# Patient Record
Sex: FEMALE | Race: WHITE
[De-identification: names, ages, dates, MRNs, and addresses within clinical notes are randomized per-mention and may not be internally consistent; named-entity substitution may affect disease eponyms.]

---

## 2019-03-20 ENCOUNTER — HOSPITAL ENCOUNTER (EMERGENCY)
Dept: HOSPITAL 75 - ER FS | Age: 79
Discharge: ANOTHER HOSPITAL | End: 2019-03-20
Payer: MEDICARE

## 2019-03-20 VITALS — WEIGHT: 110 LBS | HEIGHT: 66 IN | BODY MASS INDEX: 17.68 KG/M2

## 2019-03-20 VITALS — SYSTOLIC BLOOD PRESSURE: 123 MMHG | DIASTOLIC BLOOD PRESSURE: 82 MMHG

## 2019-03-20 DIAGNOSIS — A41.9: ICD-10-CM

## 2019-03-20 DIAGNOSIS — R65.21: ICD-10-CM

## 2019-03-20 DIAGNOSIS — N39.0: Primary | ICD-10-CM

## 2019-03-20 LAB
ALBUMIN SERPL-MCNC: 3.4 GM/DL (ref 3.2–4.5)
ALP SERPL-CCNC: 68 U/L (ref 40–136)
ALT SERPL-CCNC: 14 U/L (ref 0–55)
APTT BLD: 30 SEC (ref 24–35)
APTT PPP: YELLOW S
BACTERIA #/AREA URNS HPF: (no result) /HPF
BASOPHILS # BLD AUTO: 0 10^3/UL (ref 0–0.1)
BASOPHILS NFR BLD AUTO: 0 % (ref 0–10)
BILIRUB SERPL-MCNC: 0.3 MG/DL (ref 0.1–1)
BILIRUB UR QL STRIP: NEGATIVE
BUN/CREAT SERPL: 14
CALCIUM SERPL-MCNC: 8.8 MG/DL (ref 8.5–10.1)
CHLORIDE SERPL-SCNC: 102 MMOL/L (ref 98–107)
CO2 SERPL-SCNC: 26 MMOL/L (ref 21–32)
CREAT SERPL-MCNC: 1.46 MG/DL (ref 0.6–1.3)
EOSINOPHIL # BLD AUTO: 0.1 10^3/UL (ref 0–0.3)
EOSINOPHIL NFR BLD AUTO: 1 % (ref 0–10)
ERYTHROCYTE [DISTWIDTH] IN BLOOD BY AUTOMATED COUNT: 14.1 % (ref 10–14.5)
FIBRINOGEN PPP-MCNC: (no result) MG/DL
GFR SERPLBLD BASED ON 1.73 SQ M-ARVRAT: 35 ML/MIN
GLUCOSE SERPL-MCNC: 110 MG/DL (ref 70–105)
GLUCOSE UR STRIP-MCNC: NEGATIVE MG/DL
HCT VFR BLD CALC: 34 % (ref 35–52)
HGB BLD-MCNC: 10.8 G/DL (ref 11.5–16)
INR PPP: 1 (ref 0.8–1.4)
KETONES UR QL STRIP: NEGATIVE
LEUKOCYTE ESTERASE UR QL STRIP: (no result)
LYMPHOCYTES # BLD AUTO: 1.6 X 10^3 (ref 1–4)
LYMPHOCYTES NFR BLD AUTO: 23 % (ref 12–44)
MANUAL DIFFERENTIAL PERFORMED BLD QL: NO
MCH RBC QN AUTO: 29 PG (ref 25–34)
MCHC RBC AUTO-ENTMCNC: 32 G/DL (ref 32–36)
MCV RBC AUTO: 91 FL (ref 80–99)
MONOCYTES # BLD AUTO: 0.3 X 10^3 (ref 0–1)
MONOCYTES NFR BLD AUTO: 5 % (ref 0–12)
NEUTROPHILS # BLD AUTO: 4.8 X 10^3 (ref 1.8–7.8)
NEUTROPHILS NFR BLD AUTO: 70 % (ref 42–75)
NITRITE UR QL STRIP: NEGATIVE
PH UR STRIP: 6.5 [PH] (ref 5–9)
PLATELET # BLD: 257 10^3/UL (ref 130–400)
PMV BLD AUTO: 10.3 FL (ref 7.4–10.4)
POTASSIUM SERPL-SCNC: 4.3 MMOL/L (ref 3.6–5)
PROT SERPL-MCNC: 6.9 GM/DL (ref 6.4–8.2)
PROT UR QL STRIP: (no result)
PROTHROMBIN TIME: 12.8 SEC (ref 12.2–14.7)
RBC #/AREA URNS HPF: (no result) /HPF
SODIUM SERPL-SCNC: 142 MMOL/L (ref 135–145)
SP GR UR STRIP: <=1.005 (ref 1.02–1.02)
SQUAMOUS #/AREA URNS HPF: (no result) /HPF
UROBILINOGEN UR-MCNC: 0.2 MG/DL
WBC # BLD AUTO: 6.8 10^3/UL (ref 4.3–11)
WBC #/AREA URNS HPF: >100 /HPF

## 2019-03-20 PROCEDURE — 36415 COLL VENOUS BLD VENIPUNCTURE: CPT

## 2019-03-20 PROCEDURE — 85025 COMPLETE CBC W/AUTO DIFF WBC: CPT

## 2019-03-20 PROCEDURE — 87040 BLOOD CULTURE FOR BACTERIA: CPT

## 2019-03-20 PROCEDURE — 87088 URINE BACTERIA CULTURE: CPT

## 2019-03-20 PROCEDURE — 81000 URINALYSIS NONAUTO W/SCOPE: CPT

## 2019-03-20 PROCEDURE — 96365 THER/PROPH/DIAG IV INF INIT: CPT

## 2019-03-20 PROCEDURE — 85730 THROMBOPLASTIN TIME PARTIAL: CPT

## 2019-03-20 PROCEDURE — 96361 HYDRATE IV INFUSION ADD-ON: CPT

## 2019-03-20 PROCEDURE — 51701 INSERT BLADDER CATHETER: CPT

## 2019-03-20 PROCEDURE — 85610 PROTHROMBIN TIME: CPT

## 2019-03-20 PROCEDURE — 71045 X-RAY EXAM CHEST 1 VIEW: CPT

## 2019-03-20 PROCEDURE — 83605 ASSAY OF LACTIC ACID: CPT

## 2019-03-20 PROCEDURE — 80053 COMPREHEN METABOLIC PANEL: CPT

## 2019-03-20 PROCEDURE — 87804 INFLUENZA ASSAY W/OPTIC: CPT

## 2019-03-20 NOTE — ED GENERAL
General


Stated Complaint:  NAUSEA; VOMITING


Source of Information:  Patient


Exam Limitations:  Other (nonverbal at baseline)





History of Present Illness


Date Seen by Provider:  Mar 20, 2019


Time Seen by Provider:  09:55


Initial Comments


Patient presents to ER by EMS from [facility] with chief complaint of cough 

nausea vomiting times one day. She's been on Levaquin for a couple days for a 

UTI outpatient. No known fevers or chills. She is nonverbal baseline. Takes her 

medications mostly for psych. EMS gave her Zofran on started an IV and started 

some fluids. Her blood pressure was soft in the 100 systolic range. Oxygen sats 

in the upper 90s on room air. No history of diabetes. The patient does answer, 

"No," when asked if her belly is tender on direct palpation. The recommendation 

accompanies her also indicates that she has been ordered and DuoNeb's twice a 

day and Robitussin for cough.





Allergies and Home Medications


Allergies


Coded Allergies:  


     No Known Drug Allergies (Unverified , 3/20/19)





Patient Home Medication List


Home Medication List Reviewed:  Yes





Review of Systems


Review of Systems


Constitutional:  see HPI (review of systems Limited to staff and EMS report as 

the patient does not answer many questions.); No chills, No fever


EENTM:  No nose congestion, No throat pain


Respiratory:  cough, phlegm, wheezing


Cardiovascular:  No edema, No Hx of Intervention


Gastrointestinal:  No constipation, No diarrhea; nausea, vomiting


Musculoskeletal:  No back pain, No joint pain


Skin:  No pruritus, No rash





Past Medical-Social-Family Hx


Patient Social History


Alcohol Use:  Denies Use


Recreational Drug Use:  No


Smoking Status:  Never a Smoker





Physical Exam-Suspected Sepsis


Physical Exam


Vital Signs





Vital Signs - First Documented








 3/20/19





 10:00


 


Temp 98.5


 


Pulse 92


 


Resp 14


 


B/P (MAP) 99/57 (71)


 


Pulse Ox 97


 


O2 Delivery Room Air





Capillary Refill :


Height, Weight, BMI


Height: '"


Weight: lbs. oz. kg;  BMI


Method:


General Appearance:  No Apparent Distress, Thin


Eyes:  Bilateral Eye Normal Inspection, Bilateral Eye PERRL, Bilateral Eye EOMI


HEENT:  PERRL/EOMI, TMs Normal, Normal ENT Inspection, Pharynx Normal; No Moist 

Mucous Membranes


Neck:  Full Range of Motion, Normal Inspection, Non Tender, Supple


Respiratory:  No Accessory Muscle Use, No Respiratory Distress, Rales (few 

bilateral bases), Wheezing (few expiratory)


Cardiovascular:  Regular Rate, Rhythm, No Edema, Normal Peripheral Pulses


Gastrointestinal:  Normal Bowel Sounds, No Organomegaly, Non Tender, Soft


Extremity:  Normal Capillary Refill, Normal Inspection, No Pedal Edema


Neurologic/Psychiatric:  Alert, Oriented x3


Skin:  normal color, warm/dry





Focused Exam


Sepsis Stage:  Septic Shock


Possible Source:  Genitouriary


Lactate Level


3/20/19 10:20: Lactic Acid Level 2.74*H


3/20/19 13:01: Lactic Acid Level 1.81





Time of Focused Exam:  13:44


Respiratory:  Chest Non Tender, Lungs Clear, Normal Breath Sounds, No Accessory 

Muscle Use, No Respiratory Distress


Cardiovascular:  Regular Rate, Rhythm, No Edema, Normal Peripheral Pulses


Capillary Refill:  Less Than 3 Seconds


Peripheral Pulses:  2+ Radial Pulses (R), 2+ Radial Pulses (L)


Skin:  normal color, warm/dry


Lactic Acid Level





Laboratory Tests








Test


 3/20/19


13:01


 


Lactic Acid Level


 1.81 MMOL/L


(0.50-2.00)








Within 3hrs of presentation:  Admin fluids, Admin ABX, Blood cultures prior to 

ABX's, Focus exam, Lactate level, Vasopressin therapy (order been started)





Procedures/Interventions


Lumen:  triple


Central Line Procedure:  betadine prep (chlorhexidine prep), sterile drapes 

applied, sterile dressing applied


Position:  internal jugular (R)


Anesthesia:  Lidocaine


Volume Anesthetic (ccs):  3


Complications:  none


Post Position:  sutured, good blood return, position confirmed w/ CXR


Risks, benefits and alternatives were explained to the nephew and the patient. 

We are given the go ahead so we looked at the neck found a good site on her 

right IJ. 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 

anatomy. We used usual sterile fashion and put the introducer needle into her 

right IJ on first attempt but were unable after a few attempts to pass the 

guidewire so we withdrew the needle reposition the patient and on second 

attempt were able to pass the guidewire easily. We did cause some ventricular 

ectopy so we backed guidewire off 3 cm and the ectopy went away. We made a 

small nick in the skin using 11 blade scalpel provided and then removed the 

needle. We then passed the dilator and removed it from the guidewire. We then 

placed the central lumen of the already flushed triple lumen catheter over and 

stitched down in place at about 12.5 cm. We had good blood return and flushed 

easily. We then placed a Biopatch and sterile dressing. Patient tolerated the 

procedure well.





Progress/Results/Core Measures


Suspected Sepsis


SIRS


Temperature: 


Pulse:  


Respiratory Rate: 


 


Laboratory Tests


3/20/19 10:20: White Blood Count 6.8


Blood Pressure  / 


Mean: 


 





3/20/19 10:20: Lactic Acid Level 2.74*H


3/20/19 13:01: Lactic Acid Level 1.81


Laboratory Tests


3/20/19 10:20: 


Creatinine 1.46H, INR Comment 1.0, Platelet Count 257, Total Bilirubin 0.3








Results/Orders


Lab Results





Laboratory Tests








Test


 3/20/19


10:20 3/20/19


11:50 3/20/19


13:01 Range/Units


 


 


White Blood Count


 6.8 


 


 


 4.3-11.0


10^3/uL


 


Red Blood Count


 3.69 L


 


 


 4.35-5.85


10^6/uL


 


Hemoglobin 10.8 L   11.5-16.0  G/DL


 


Hematocrit 34 L   35-52  %


 


Mean Corpuscular Volume 91    80-99  FL


 


Mean Corpuscular Hemoglobin 29    25-34  PG


 


Mean Corpuscular Hemoglobin


Concent 32 


 


 


 32-36  G/DL





 


Red Cell Distribution Width 14.1    10.0-14.5  %


 


Platelet Count


 257 


 


 


 130-400


10^3/uL


 


Mean Platelet Volume 10.3    7.4-10.4  FL


 


Neutrophils (%) (Auto) 70    42-75  %


 


Lymphocytes (%) (Auto) 23    12-44  %


 


Monocytes (%) (Auto) 5    0-12  %


 


Eosinophils (%) (Auto) 1    0-10  %


 


Basophils (%) (Auto) 0    0-10  %


 


Neutrophils # (Auto) 4.8    1.8-7.8  X 10^3


 


Lymphocytes # (Auto) 1.6    1.0-4.0  X 10^3


 


Monocytes # (Auto) 0.3    0.0-1.0  X 10^3


 


Eosinophils # (Auto)


 0.1 


 


 


 0.0-0.3


10^3/uL


 


Basophils # (Auto)


 0.0 


 


 


 0.0-0.1


10^3/uL


 


Prothrombin Time 12.8    12.2-14.7  SEC


 


INR Comment 1.0    0.8-1.4  


 


Activated Partial


Thromboplast Time 30 


 


 


 24-35  SEC





 


Sodium Level 142    135-145  MMOL/L


 


Potassium Level 4.3    3.6-5.0  MMOL/L


 


Chloride Level 102      MMOL/L


 


Carbon Dioxide Level 26    21-32  MMOL/L


 


Anion Gap 14    5-14  MMOL/L


 


Blood Urea Nitrogen 20 H   7-18  MG/DL


 


Creatinine


 1.46 H


 


 


 0.60-1.30


MG/DL


 


Estimat Glomerular Filtration


Rate 35 


 


 


  





 


BUN/Creatinine Ratio 14     


 


Glucose Level 110 H     MG/DL


 


Lactic Acid Level


 2.74 *H


 


 1.81 


 0.50-2.00


MMOL/L


 


Calcium Level 8.8    8.5-10.1  MG/DL


 


Corrected Calcium 9.3    8.5-10.1  MG/DL


 


Total Bilirubin 0.3    0.1-1.0  MG/DL


 


Aspartate Amino Transf


(AST/SGOT) 17 


 


 


 5-34  U/L





 


Alanine Aminotransferase


(ALT/SGPT) 14 


 


 


 0-55  U/L





 


Alkaline Phosphatase 68      U/L


 


Total Protein 6.9    6.4-8.2  GM/DL


 


Albumin 3.4    3.2-4.5  GM/DL


 


Urine Color  YELLOW    


 


Urine Clarity  SL CLOUDY    


 


Urine pH  6.5   5-9  


 


Urine Specific Gravity  <=1.005   1.016-1.022  


 


Urine Protein  TRACE   NEGATIVE  


 


Urine Glucose (UA)  NEGATIVE   NEGATIVE  


 


Urine Ketones  NEGATIVE   NEGATIVE  


 


Urine Nitrite  NEGATIVE   NEGATIVE  


 


Urine Bilirubin  NEGATIVE   NEGATIVE  


 


Urine Urobilinogen  0.2   NORMAL  MG/DL


 


Urine Leukocyte Esterase  3+ H  NEGATIVE  


 


Urine RBC (Auto)  TRACE H  NEGATIVE  


 


Urine RBC  0-2    /HPF


 


Urine WBC  >100 H   /HPF


 


Urine Squamous Epithelial


Cells 


 2-5 


 


  /HPF





 


Urine Crystals  NONE    /LPF


 


Urine Bacteria  TRACE    /HPF


 


Urine Casts  NONE    /LPF


 


Urine Mucus  NONE    /LPF


 


Urine Culture Indicated  NO    








Micro Results





Microbiology


3/20/19 Influenza Types A,B Antigen (TADEO) - Final, Complete


          





My Orders





Orders - MAKSIM CHUNG


Cbc With Automated Diff (3/20/19 10:03)


Comprehensive Metabolic Panel (3/20/19 10:03)


Blood Culture (3/20/19 10:03)


Sputum Culture (3/20/19 10:03)


Urinalysis (3/20/19 10:03)


Urine Culture (3/20/19 10:03)


Protime With Inr (3/20/19 10:03)


Partial Thromboplastin Time (3/20/19 10:03)


Chest 1 View Ap/Pa Only (3/20/19 10:03)


Saline Lock/Iv-Start (3/20/19 10:03)


Saline Lock/Iv-Start (3/20/19 10:03)


Vital Signs Adult Sepsis Patie Q15M (3/20/19 10:03)


O2 (3/20/19 10:03)


Remove Rings In Anticipation O (3/20/19 10:03)


Lactic Acid Analyzer (3/20/19 10:03)


Ns Iv 1000 Ml (Sodium Chloride 0.9%) (3/20/19 10:03)


Cefepime Injection (Maxipime Injection) (3/20/19 10:15)


Saline Lock/Iv-Start (3/20/19 10:03)


Influenza A And B Antigens (3/20/19 10:03)


Albuterol/Ipra Inhalation Soln (Duoneb I (3/20/19 10:15)


Svn Small Volume Nebulizer (3/20/19 10:10)


Straight Cath For Spec.-Adult (3/20/19 11:45)


Norepinephrine (Levophed) (3/20/19 12:15)


Chest 1 View Ap/Pa Only (3/20/19 12:01)


Lactated Ringers (Lr 1000 Ml Iv Solution (3/20/19 13:49)


C Difficile Ag + Toxin A/B. (3/20/19 14:27)


Isolation Central Supply Req (3/20/19 14:27)





Medications Given in ED





Current Medications








 Medications  Dose


 Ordered  Sig/Alissa


 Route  Start Time


 Stop Time Status Last Admin


Dose Admin


 


 Albuterol/


 Ipratropium  3 ml  ONCE  ONCE


 INH  3/20/19 10:15


 3/20/19 10:16 DC 3/20/19 10:42


3 ML


 


 Cefepime HCl 1000


 mg/Sterile Water  10 ml @ 


 200 mls/hr  ONCE  ONCE


 IV  3/20/19 10:15


 3/20/19 10:17 DC 3/20/19 10:42


200 MLS/HR








Vital Signs/I&O











 3/20/19





 10:00


 


Temp 98.5


 


Pulse 92


 


Resp 14


 


B/P (MAP) 99/57 (71)


 


Pulse Ox 97


 


O2 Delivery Room Air





Capillary Refill :


Progress Note #1:  


   Time:  10:09


Progress Note


Soft blood pressure with a map of 67, heart rate above 90 recently being 

treated for UTI. Plan to do a septic workup and give a dose of cefepime. We'll 

also give her a DuoNeb and reassess her lungs after a chest x-ray.


Progress Note #2:  


   Time:  12:00


Progress Note


The patient's blood pressure has become very soft with a map going down to 60 

so we put her in Trendelenburg and given her maximal 30 mL/kg of fluids will 

continue these fluids at 150 an hour and put a central line in to do pressors 

with. We discussed the case with a nephew who is the D POA and he would like us 

to do everything including central lines or intubation up to the point of DO 

NOT RESUSCITATE if she has a cardiac arrest. He would also like the patient 

sent to [place] because she has a sister who lives there would be able to help 

visit and take care of her afterwards.


Patient is had multiple alarms her V. tach at 100 bpm but they've all been 

reviewed and none of them actually demonstrated V. tach.


Progress Note #3:  


   Time:  14:37


Progress Note


Initially the patient had 2 loose stools and so our plan was to get a C. 

difficile tox on the next one since she had been on Levaquin outpatient for 

about 5-7 days. Her third stool had a large impacted hard bowel movement in the 

middle of it and staff at the facility her marked she's been constipated 

lately. She does have MiraLAX on her order sheet when necessary. Since the 

stool is formed and hard I do not suspect that this is C. difficile colitis. 

She has no elevated white count, abdominal tenderness to palpation or fever 

currently.





Diagnostic Imaging





   Diagonstic Imaging:  Xray


   Plain Films/CT/US/NM/MRI:  chest (1v)


Comments


 [facility]


 [place], [place]





NAME:   KASIA CHANEY


MED REC#:   [id number]


ACCOUNT#:   [id number]


PT STATUS:   REG ER


:   1940


PHYSICIAN:   MAKSIM CHUNG MD


ADMIT DATE:   19/ER FS


 ***Draft***


Date of Exam:19





CHEST 1 VIEW AP/PA ONLY








INDICATION: Nausea and vomiting.





Time of exam 9:16 AM





No prior studies are available for comparison.





The heart size is normal. The pulmonary vascularity is


unremarkable. The lungs are clear. No infiltrate, effusion or


pneumothorax is detected.





Impression: No acute cardiopulmonary process is detected.





  Dictated on workstation # [id number]








Dict:   19 1025


Trans:   19 1025


AQUILES [id number]





Interpreted by:     VERONICA STOREY MD


Electronically signed by:


   Reviewed:  Reviewed by Me








   Diagonstic Imaging:  Xray


   Plain Films/CT/US/NM/MRI:  chest (1v)


Comments


NAME:   KASIA CHANEY


MED REC#:   [id number]


ACCOUNT#:   [id number]


PT STATUS:   REG ER


:   1940


PHYSICIAN:   MAKSIM CHUNG MD


ADMIT DATE:   19/ER FS


 ***Signed***


Date of Exam:19





CHEST 1 VIEW AP/PA ONLY








Indication: Central line placement





Portable chest 11:42 AM





Right jugular central line tip projects over the right innominate


vein. Heart size and pulmonary vascular normal. Lungs are clear.


There are no effusions or pneumothoraces.





Impression: No acute abnormalities in the chest.





Dictated by: 





  Dictated on workstation # [id number]








Dict:   19 1308


Trans:   19 1309


TB [id number]





Interpreted by:     ABDULAZIZ DALY MD


Electronically signed by: ABDULAZIZ DALY MD [id number] 1309


   Reviewed:  Reviewed by Me





Critical Care Note


Critical Care


Start Time:  13:15


Stop Time:  13:45


Total Time (minutes)


30 mins


Progress


Reviewed her labs and repeat examination. Her breath sounds are better after a 

DuoNeb but her heart rate is still up around 100 and her blood pressure was 

dropping where her map was 58. We ordered Levophed to be started peripherally 

and start a central line after discussing with the nephew who is the D POA who 

lives in [place]. He does want us to go ahead and pursue everything and 

would like us to send her to [facility] were her family is. We get a 

chest x-ray after the central line but unfortunately the positioning was poor 

so we repeated it and then could see good placement of central line shadow over 

the superior vena cava. Radiologist agreed. Before Levophed could be started 

however the patient's blood pressure improved just by putting her in 

Trendelenburg so we'll keep some IV fluids going at 100 cc an hour.





Departure


Impression





 Primary Impression:  


 UTI (urinary tract infection)


 Qualified Codes:  N30.00 - Acute cystitis without hematuria


 Additional Impression:  


 Septic shock


Disposition:   XFER SHT-TRM HOSP


Condition:  Stable





Transfer


Time Spoke to Accepting Phy:  13:50


Transfer Progress Notes


1335: Called [facility] and discussed with 1- call and they will 

page Dr. Gray.


Transfer Facility:  


[place], [place]


Method of Transfer:  EMS





Copy


Copies To 1:   SELF,MAKSIM VENCES MD Mar 20, 2019 10:10

## 2019-03-20 NOTE — DIAGNOSTIC IMAGING REPORT
Indication: Central line placement



Portable chest 11:42 AM



Right jugular central line tip projects over the right innominate

vein. Heart size and pulmonary vascular normal. Lungs are clear.

There are no effusions or pneumothoraces.



Impression: No acute abnormalities in the chest.



Dictated by: 



  Dictated on workstation # [id number]

## 2019-03-20 NOTE — DIAGNOSTIC IMAGING REPORT
INDICATION: Nausea and vomiting.



Time of exam 9:16 AM



No prior studies are available for comparison.



The heart size is normal. The pulmonary vascularity is

unremarkable. The lungs are clear. No infiltrate, effusion or

pneumothorax is detected.



Impression: No acute cardiopulmonary process is detected.



Dictated by: 



  Dictated on workstation # [id number]

## 2020-01-02 ENCOUNTER — HOSPITAL ENCOUNTER (EMERGENCY)
Dept: HOSPITAL 75 - ER FS | Age: 80
Discharge: ANOTHER HOSPITAL | End: 2020-01-02
Payer: MEDICARE

## 2020-01-02 VITALS — HEIGHT: 61.81 IN | BODY MASS INDEX: 15.01 KG/M2 | WEIGHT: 81.57 LBS

## 2020-01-02 VITALS — DIASTOLIC BLOOD PRESSURE: 92 MMHG | SYSTOLIC BLOOD PRESSURE: 154 MMHG

## 2020-01-02 DIAGNOSIS — K56.609: ICD-10-CM

## 2020-01-02 DIAGNOSIS — N39.0: Primary | ICD-10-CM

## 2020-01-02 DIAGNOSIS — F03.90: ICD-10-CM

## 2020-01-02 LAB
ALBUMIN SERPL-MCNC: 4 GM/DL (ref 3.2–4.5)
ALP SERPL-CCNC: 78 U/L (ref 40–136)
ALT SERPL-CCNC: 100 U/L (ref 0–55)
APTT PPP: YELLOW S
BACTERIA #/AREA URNS HPF: (no result) /HPF
BASOPHILS # BLD AUTO: 0 10^3/UL (ref 0–0.1)
BASOPHILS NFR BLD AUTO: 0 % (ref 0–10)
BASOPHILS NFR BLD MANUAL: 2 %
BILIRUB SERPL-MCNC: 0.6 MG/DL (ref 0.1–1)
BILIRUB UR QL STRIP: (no result)
BUN/CREAT SERPL: 41
CALCIUM SERPL-MCNC: 9.5 MG/DL (ref 8.5–10.1)
CHLORIDE SERPL-SCNC: 97 MMOL/L (ref 98–107)
CO2 SERPL-SCNC: 28 MMOL/L (ref 21–32)
CREAT SERPL-MCNC: 1.79 MG/DL (ref 0.6–1.3)
EOSINOPHIL # BLD AUTO: 0 10^3/UL (ref 0–0.3)
EOSINOPHIL NFR BLD AUTO: 0 % (ref 0–10)
EOSINOPHIL NFR BLD MANUAL: 0 %
ERYTHROCYTE [DISTWIDTH] IN BLOOD BY AUTOMATED COUNT: 12 % (ref 10–14.5)
FIBRINOGEN PPP-MCNC: (no result) MG/DL
GFR SERPLBLD BASED ON 1.73 SQ M-ARVRAT: 27 ML/MIN
GLUCOSE SERPL-MCNC: 188 MG/DL (ref 70–105)
GLUCOSE UR STRIP-MCNC: NEGATIVE MG/DL
HCT VFR BLD CALC: 33 % (ref 35–52)
HGB BLD-MCNC: 11 G/DL (ref 11.5–16)
KETONES UR QL STRIP: NEGATIVE
LEUKOCYTE ESTERASE UR QL STRIP: (no result)
LYMPHOCYTES # BLD AUTO: 0.3 X 10^3 (ref 1–4)
LYMPHOCYTES NFR BLD AUTO: 5 % (ref 12–44)
MANUAL DIFFERENTIAL PERFORMED BLD QL: YES
MCH RBC QN AUTO: 31 PG (ref 25–34)
MCHC RBC AUTO-ENTMCNC: 33 G/DL (ref 32–36)
MCV RBC AUTO: 95 FL (ref 80–99)
MONOCYTES # BLD AUTO: 0.4 X 10^3 (ref 0–1)
MONOCYTES NFR BLD AUTO: 7 % (ref 0–12)
MONOCYTES NFR BLD: 6 %
NEUTROPHILS # BLD AUTO: 5.4 X 10^3 (ref 1.8–7.8)
NEUTROPHILS NFR BLD AUTO: 87 % (ref 42–75)
NEUTS BAND NFR BLD MANUAL: 40 %
NEUTS BAND NFR BLD: 40 %
NITRITE UR QL STRIP: NEGATIVE
PH UR STRIP: 5.5 [PH] (ref 5–9)
PLATELET # BLD: 299 10^3/UL (ref 130–400)
PMV BLD AUTO: 10.9 FL (ref 7.4–10.4)
POTASSIUM SERPL-SCNC: 3.6 MMOL/L (ref 3.6–5)
PROT SERPL-MCNC: 7.7 GM/DL (ref 6.4–8.2)
PROT UR QL STRIP: (no result)
RBC #/AREA URNS HPF: (no result) /HPF
SODIUM SERPL-SCNC: 146 MMOL/L (ref 135–145)
SP GR UR STRIP: >1.03 (ref 1.02–1.02)
TOXIC GRANULES BLD QL SMEAR: (no result)
VARIANT LYMPHS NFR BLD MANUAL: 12 %
WBC # BLD AUTO: 6.2 10^3/UL (ref 4.3–11)
WBC #/AREA URNS HPF: (no result) /HPF

## 2020-01-02 PROCEDURE — 71045 X-RAY EXAM CHEST 1 VIEW: CPT

## 2020-01-02 PROCEDURE — 36415 COLL VENOUS BLD VENIPUNCTURE: CPT

## 2020-01-02 PROCEDURE — 74019 RADEX ABDOMEN 2 VIEWS: CPT

## 2020-01-02 PROCEDURE — 87088 URINE BACTERIA CULTURE: CPT

## 2020-01-02 PROCEDURE — 96375 TX/PRO/DX INJ NEW DRUG ADDON: CPT

## 2020-01-02 PROCEDURE — 85007 BL SMEAR W/DIFF WBC COUNT: CPT

## 2020-01-02 PROCEDURE — 85027 COMPLETE CBC AUTOMATED: CPT

## 2020-01-02 PROCEDURE — 87077 CULTURE AEROBIC IDENTIFY: CPT

## 2020-01-02 PROCEDURE — 51702 INSERT TEMP BLADDER CATH: CPT

## 2020-01-02 PROCEDURE — 87040 BLOOD CULTURE FOR BACTERIA: CPT

## 2020-01-02 PROCEDURE — 96374 THER/PROPH/DIAG INJ IV PUSH: CPT

## 2020-01-02 PROCEDURE — 83605 ASSAY OF LACTIC ACID: CPT

## 2020-01-02 PROCEDURE — 81000 URINALYSIS NONAUTO W/SCOPE: CPT

## 2020-01-02 PROCEDURE — 84484 ASSAY OF TROPONIN QUANT: CPT

## 2020-01-02 PROCEDURE — 96361 HYDRATE IV INFUSION ADD-ON: CPT

## 2020-01-02 PROCEDURE — 80053 COMPREHEN METABOLIC PANEL: CPT

## 2020-01-02 RX ADMIN — SODIUM CHLORIDE SCH MLS/HR: 900 INJECTION, SOLUTION INTRAVENOUS at 10:13

## 2020-01-02 RX ADMIN — SODIUM CHLORIDE SCH MLS/HR: 900 INJECTION, SOLUTION INTRAVENOUS at 10:52

## 2020-01-02 NOTE — ED GENERAL
General


Chief Complaint:  Respiratory Problems


Stated Complaint:  VOMITING





History of Present Illness


Date Seen by Provider:  Jan 2, 2020


Time Seen by Provider:  09:07


Initial Comments


80 yo female brought from nursing facility


pt is severely debilitated   dementia  non-verbal  DNR


seen here last year with  had sepsis UTI


report today is several hours of vomiting and low O2 sats noted this AM 


hx very limited





Allergies and Home Medications


Allergies


Coded Allergies:  


     No Known Drug Allergies (Unverified , 3/20/19)





Patient Home Medication List


Home Medication List Reviewed:  Yes





Review of Systems


Review of Systems


Constitutional:  no symptoms reported


EENTM:  no symptoms reported


Respiratory:  short of breath (low sats at NH)


Cardiovascular:  no symptoms reported


Gastrointestinal:  abdominal pain, nausea, vomiting


Pregnant:  No





Past Medical-Social-Family Hx


Patient Social History


2nd Hand Smoke Exposure:  No


Recent Foreign Travel:  Yes


Recent Hopitalizations:  No





Seasonal Allergies


Seasonal Allergies:  No





Past Medical History


Surgeries:  No


Respiratory:  No


Cardiac:  No


Neurological:  Yes (non-verbal)


Genitourinary:  No


Gastrointestinal:  No


Musculoskeletal:  Yes (bed bound)


Endocrine:  No


HEENT:  No


Cancer:  No


Psychosocial:  No


Integumentary:  No


Blood Disorders:  No





Physical Exam


Vital Signs





Vital Signs - First Documented








 1/2/20 1/2/20





 08:49 08:50


 


Temp 37.2 


 


Pulse 116 


 


Resp 22 


 


B/P (MAP) 153/92 (112) 


 


Pulse Ox 91 


 


O2 Delivery Room Air 


 


O2 Flow Rate  4.00


 


FiO2  94





Capillary Refill :


Height, Weight, BMI


Height: 5'6.00"


Weight: 110lbs. oz. 49.236839tb;  BMI


Method:Estimated


General Appearance:  No Apparent Distress


Eyes:  Bilateral Eye PERRL, Bilateral Eye EOMI


HEENT:  Other (tongue dry)


Respiratory:  Lungs Clear


Cardiovascular:  Regular Rate, Rhythm


Gastrointestinal:  Tenderness, Other (abd bloated and tight)


Rectal:  Other (no fecal impaction)


Back:  Other (no open skin ulceration)


Extremity:  No Pedal Edema


Neurologic/Psychiatric:  Alert, No Motor/Sensory Deficits, CNs II-XII Norm as 

Tested





Focused Exam


Lactate Level


1/2/20 08:50: Lactic Acid Level 4.30*H





Lactic Acid Level





Laboratory Tests








Test


 1/2/20


08:50


 


Lactic Acid Level


 4.30 MMOL/L


(0.50-2.00)  *H











Progress/Results/Core Measures


Suspected Sepsis


SIRS


Temperature: 


Pulse:  


Respiratory Rate: 


 


Laboratory Tests


1/2/20 08:50: White Blood Count 6.2


Blood Pressure  / 


Mean: 


 





1/2/20 08:50: Lactic Acid Level 4.30*H








Laboratory Tests


1/2/20 08:50: 


Creatinine 1.79H, Platelet Count 299, Total Bilirubin 0.6





Results/Orders


Lab Results





Laboratory Tests








Test


 1/2/20


08:50 1/2/20


09:40 Range/Units


 


 


White Blood Count


 6.2 


 


 4.3-11.0


10^3/uL


 


Red Blood Count


 3.52 L


 


 4.35-5.85


10^6/uL


 


Hemoglobin 11.0 L  11.5-16.0  G/DL


 


Hematocrit 33 L  35-52  %


 


Mean Corpuscular Volume 95   80-99  FL


 


Mean Corpuscular Hemoglobin 31   25-34  PG


 


Mean Corpuscular Hemoglobin


Concent 33 


 


 32-36  G/DL





 


Red Cell Distribution Width 12.0   10.0-14.5  %


 


Platelet Count


 299 


 


 130-400


10^3/uL


 


Mean Platelet Volume 10.9 H  7.4-10.4  FL


 


Neutrophils (%) (Auto) 87 H  42-75  %


 


Lymphocytes (%) (Auto) 5 L  12-44  %


 


Monocytes (%) (Auto) 7   0-12  %


 


Eosinophils (%) (Auto) 0   0-10  %


 


Basophils (%) (Auto) 0   0-10  %


 


Neutrophils # (Auto) 5.4   1.8-7.8  X 10^3


 


Lymphocytes # (Auto) 0.3 L  1.0-4.0  X 10^3


 


Monocytes # (Auto) 0.4   0.0-1.0  X 10^3


 


Eosinophils # (Auto)


 0.0 


 


 0.0-0.3


10^3/uL


 


Basophils # (Auto)


 0.0 


 


 0.0-0.1


10^3/uL


 


Sodium Level 146 H  135-145  MMOL/L


 


Potassium Level 3.6   3.6-5.0  MMOL/L


 


Chloride Level 97 L    MMOL/L


 


Carbon Dioxide Level 28   21-32  MMOL/L


 


Anion Gap 21 H  5-14  MMOL/L


 


Blood Urea Nitrogen 73 H  7-18  MG/DL


 


Creatinine


 1.79 H


 


 0.60-1.30


MG/DL


 


Estimat Glomerular Filtration


Rate 27 


 


  





 


BUN/Creatinine Ratio 41    


 


Glucose Level 188 H    MG/DL


 


Lactic Acid Level


 4.30 *H


 


 0.50-2.00


MMOL/L


 


Calcium Level 9.5   8.5-10.1  MG/DL


 


Corrected Calcium 9.5   8.5-10.1  MG/DL


 


Total Bilirubin 0.6   0.1-1.0  MG/DL


 


Aspartate Amino Transf


(AST/SGOT) 70 H


 


 5-34  U/L





 


Alanine Aminotransferase


(ALT/SGPT) 100 H


 


 0-55  U/L





 


Alkaline Phosphatase 78     U/L


 


Troponin I < 0.30   <0.30  NG/ML


 


Total Protein 7.7   6.4-8.2  GM/DL


 


Albumin 4.0   3.2-4.5  GM/DL


 


Urine Color  YELLOW   


 


Urine Clarity  CLOUDY   


 


Urine pH  5.5  5-9  


 


Urine Specific Gravity  >1.030  1.016-1.022  


 


Urine Protein  2+ H NEGATIVE  


 


Urine Glucose (UA)  NEGATIVE  NEGATIVE  


 


Urine Ketones  NEGATIVE  NEGATIVE  


 


Urine Nitrite  NEGATIVE  NEGATIVE  


 


Urine Bilirubin  1+ H NEGATIVE  


 


Urine Urobilinogen  0.2  < = 1.0  MG/DL


 


Urine Leukocyte Esterase  2+ H NEGATIVE  


 


Urine RBC (Auto)  3+ H NEGATIVE  


 


Urine RBC  TNTC H  /HPF


 


Urine WBC  NONE   /HPF


 


Urine Crystals  NONE   /LPF


 


Urine Bacteria  LARGE H  /HPF


 


Urine Casts  NONE   /LPF


 


Urine Mucus  NEGATIVE   /LPF


 


Urine Culture Indicated


 


 CULTURE


PENDING  











Micro Results





Microbiology





My Orders





Orders - NILA WALL MD


Iv Maintain (Order) (1/2/20 09:18)


Gunderson Cath (1/2/20 09:18)


Ns Iv 1000 Ml (Sodium Chloride 0.9%) (1/2/20 09:30)


Cardiac Monitor (1/2/20 09:18)


Blood Culture (1/2/20 09:18)


Ekg Tracing (1/2/20 09:24)


Abdomen Flat & Upright/Decub (1/2/20 09:38)


Urine Culture (1/2/20 09:45)


Ciprofloxacin Iv 400mg/200ml (Cipro Iv S (1/2/20 09:45)


Vital Signs Adult Sepsis Patie Q15M (1/2/20 09:47)


Ondansetron Injection (Zofran Injectio (1/2/20 10:00)


O2 (1/2/20 09:47)


Ns Iv 1000 Ml (Sodium Chloride 0.9%) (1/2/20 09:47)


Cefepime Injection (Maxipime Injection) (1/2/20 10:00)


Ng Tube Insert & Assessment (1/2/20 10:11)


Ekg Tracing (1/2/20 10:40)


Comprehensive Metabolic Panel (1/2/20 08:50)


Troponin I Fs (1/2/20 08:50)


Blood Culture (1/2/20 08:50)


Urinalysis (1/2/20 09:40)


Cbc With Automated Diff (1/2/20 08:50)


Manual Differential (1/2/20 08:50)


Lactic Acid Analyzer (1/2/20 08:50)





Medications Given in ED





Current Medications








 Medications  Dose


 Ordered  Sig/Alissa


 Route  Start Time


 Stop Time Status Last Admin


Dose Admin


 


 Cefepime HCl 1000


 mg/Sterile Water  10 ml @ 


 200 mls/hr  ONCE  ONCE


 IV  1/2/20 10:00


 1/2/20 10:02 DC 1/2/20 10:13


200 MLS/HR


 


 Ciprofloxacin/


 Dextrose  200 ml @ 


 200 mls/hr  ONCE  ONCE


 IV  1/2/20 09:45


 1/2/20 10:44 DC 1/2/20 10:13


200 MLS/HR


 


 Ondansetron HCl  4 mg  PRN  PRN


 IV  1/2/20 10:00


 1/2/20 10:14 DC 1/2/20 10:13


4 MG








Vital Signs/I&O











 1/2/20 1/2/20





 08:49 08:50


 


Temp 37.2 


 


Pulse 116 


 


Resp 22 


 


B/P (MAP) 153/92 (112) 


 


Pulse Ox 91 94


 


O2 Delivery Room Air Nasal Cannula


 


O2 Flow Rate  4.00


 


FiO2  94





Capillary Refill :


Progress Note :  


Progress Note


pt stable VS's


abd distended and firm  films show marked small bowel distension  NG ordered


CXR possible left basilar infiltrate


urine is brown opaque  UA ++for infection  


cultures of blood and urine ordered  Cefipime and Cipro ordered


Hb 11  WBC 6,200


lactate 4.3  giving IV fluids aggressively


BUN/creat  73/1.8  AG 21


EKG sinus tach @ 106  no acute ST changes





assess - UTI


severe ileus  vs bowel obst     possible LLL infiltrate


family requests pt transfer to [place]


call placed to transfer line [place]


discussed with hospitalist Dr. Venkata Leach, agrees to accept





Departure


Impression





   Primary Impression:  


   Urinary tract infection


   Qualified Codes:  N30.01 - Acute cystitis with hematuria


   Additional Impression:  


   Bowel obstruction


   Qualified Codes:  K56.609 - Unspecified intestinal obstruction, unspecified 

   as to partial versus complete obstruction


Disposition:  02 XFER [facility]


Condition:  Improved





Transfer


Transfer Reason:  Patient preference (family requests transfer to [place])


Time Spoke to Accepting Phy:  11:12


Transfer Progress Notes


see progress note


Transfer Time:  11:13


Transfer Facility:  


[facility]


Method of Transfer:  [facility]





Departure-Patient Inst.


Referrals:  


NO,LOCAL PHYSICIAN (PCP/Family)


Primary Care Physician











NILA WALL MD               Jan 2, 2020 09:12

## 2020-01-02 NOTE — NUR
Spoke to [facility]. They provided nephew's (POA) Brandon Ever phone 
numbers. [phone] and [phone]. She stated that they normally go to 
[facility]. Her sister lives near there.

## 2020-01-02 NOTE — DIAGNOSTIC IMAGING REPORT
INDICATION: Abdominal distention. Nausea and vomiting.



COMPARISON: None



FINDINGS: Supine and upright frontal radiographic views of the

abdomen were obtained. Left lateral decubitus view was also

performed. There is moderate diffuse gaseous distention of the

small bowel. At its largest, small bowel measures approximately

4.6 cm in diameter. Mild air and stool is also noted within the

colon. There is no large collection of free intraperitoneal air.

No unexpected radiopaque foreign bodies are seen. Included

portions of the lung bases are clear.



IMPRESSION:

1. Moderate diffuse gaseous distention of the small bowel

concerning for obstruction.



Dictated by: 



  Dictated on workstation # [id number]

## 2020-01-06 NOTE — RADIOLOGY REPORT
NAME:   KASIA CHANEY

MED REC#:   [id number]

ACCOUNT#:   [id number]

PT STATUS:   DEP ER

:   1940

PHYSICIAN:   NILA WALL MD

ADMIT DATE:   19/ER FS



*CORRECTED*

                                  ***Signed***

Date of Exam:20



CHEST 1 VIEW AP/PA ONLY





INDICATION: Vomiting and difficulty breathing.



8:49 AM



Correlation is made with prior chest from 2019.



There is some increased density in the left base suggestive of

pneumonia. Right lung is clear. No effusion or pneumothorax is

identified.



IMPRESSION: Findings suggestive of patchy left basilar pneumonia.



Dictated by: 



  Dictated on workstation # [id number]





Dict:   2018

Trans:   20 1540

CV [id number]



Interpreted by:     VERONICA STOREY MD

Electronically signed by: VERONICA STOREY MD 20 1540

MTDD